# Patient Record
(demographics unavailable — no encounter records)

---

## 2024-10-14 NOTE — PLAN
[FreeTextEntry1] : 72-year-old male presents to establish health care at this facility Chiropractic medicine  2 children Very physically active with tennis and softball Non-smoker/social drinker Lab work for full evaluation chronic fatigue generalized lab work drawn

## 2024-10-14 NOTE — HEALTH RISK ASSESSMENT
[0] : 2) Feeling down, depressed, or hopeless: Not at all (0) [PHQ-2 Negative - No further assessment needed] : PHQ-2 Negative - No further assessment needed [[id number]] : 0